# Patient Record
Sex: FEMALE | Race: BLACK OR AFRICAN AMERICAN | Employment: FULL TIME | URBAN - METROPOLITAN AREA
[De-identification: names, ages, dates, MRNs, and addresses within clinical notes are randomized per-mention and may not be internally consistent; named-entity substitution may affect disease eponyms.]

---
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
## 2021-11-04 ENCOUNTER — OFFICE VISIT (OUTPATIENT)
Dept: ORTHOPEDIC SURGERY | Age: 21
End: 2021-11-04
Payer: MEDICAID

## 2021-11-04 VITALS
WEIGHT: 190 LBS | BODY MASS INDEX: 32.44 KG/M2 | HEIGHT: 64 IN | TEMPERATURE: 97 F | HEART RATE: 94 BPM | OXYGEN SATURATION: 100 %

## 2021-11-04 DIAGNOSIS — M25.661 DECREASED RANGE OF MOTION (ROM) OF RIGHT KNEE: ICD-10-CM

## 2021-11-04 DIAGNOSIS — M25.461 EFFUSION OF RIGHT KNEE: ICD-10-CM

## 2021-11-04 DIAGNOSIS — M23.91 INTERNAL DERANGEMENT OF RIGHT KNEE: ICD-10-CM

## 2021-11-04 DIAGNOSIS — M25.561 ACUTE PAIN OF RIGHT KNEE: Primary | ICD-10-CM

## 2021-11-04 PROCEDURE — 99203 OFFICE O/P NEW LOW 30 MIN: CPT | Performed by: PHYSICIAN ASSISTANT

## 2021-11-04 PROCEDURE — 73562 X-RAY EXAM OF KNEE 3: CPT | Performed by: PHYSICIAN ASSISTANT

## 2021-11-04 PROCEDURE — 20610 DRAIN/INJ JOINT/BURSA W/O US: CPT | Performed by: PHYSICIAN ASSISTANT

## 2021-11-04 RX ORDER — TRIAMCINOLONE ACETONIDE 40 MG/ML
40 INJECTION, SUSPENSION INTRA-ARTICULAR; INTRAMUSCULAR ONCE
Status: COMPLETED | OUTPATIENT
Start: 2021-11-04 | End: 2021-11-04

## 2021-11-04 RX ADMIN — TRIAMCINOLONE ACETONIDE 40 MG: 40 INJECTION, SUSPENSION INTRA-ARTICULAR; INTRAMUSCULAR at 15:11

## 2021-11-04 NOTE — PROGRESS NOTES
Patient: Heriberto Graham                MRN: [id number]       SSN: [id number]  YOB: 2000        AGE: 24 y.o. SEX: female          PCP: Caleb Dunlap MD  11/04/21    Chief Complaint   Patient presents with    Knee Pain     Right       HISTORY:  Heriberto Graham is a 24 y.o. female presents to the office with acute onset of pain to the right knee. She has swelling also noted. No problems with weightbearing however flexing her knee is painful. History of a dislocated patella back in 2017 that was nonsurgically addressed. She did have follow-up physical therapy afterwards and believes she was seen through [facility] services. Pain Assessment  11/4/2021   Location of Pain Knee   Location Modifiers Right   Severity of Pain 7   Quality of Pain Aching; Sharp   Duration of Pain Persistent   Frequency of Pain Intermittent   Aggravating Factors Standing;Stairs   Limiting Behavior No   Relieving Factors Elevation; Ice   Result of Injury No           No results found for: HBA1C, RHL4GPZT, ECW8BRRQ  Weight Metrics 11/4/2021 7/17/2019 7/10/2015 4/14/2015   Weight 190 lb 200 lb 148 lb 146 lb   BMI 32.61 kg/m2 34.33 kg/m2 24.36 kg/m2 25.05 kg/m2            Problem List Items Addressed This Visit     None      Visit Diagnoses     Acute pain of right knee    -  Primary    Relevant Orders    AMB POC X-RAY KNEE 3 VIEW (Completed)    Effusion of right knee        Decreased range of motion (ROM) of right knee        Internal derangement of right knee              PAST MEDICAL HISTORY: No past medical history on file. PAST SURGICAL HISTORY: No past surgical history on file. ALLERGIES: No Known Allergies     CURRENT MEDICATIONS:  A list of medications prior to the time of admission include:  Prior to Admission medications    Medication Sig Start Date End Date Taking?  Authorizing Provider   ibuprofen (MOTRIN) 600 mg tablet Take 1 Tab by mouth every six (6) hours as needed for Pain. 7/20/19  Yes Silvana Stapleton MD   ferrous sulfate 325 mg (65 mg iron) tablet Take 1 Tab by mouth two (2) times daily (with meals). Patient not taking: Reported on 11/4/2021 7/20/19   Silvana Stapleton MD   PNV KI.32/EKJSSQT fum/folic ac (PRENATAL PO) Take  by mouth. Patient not taking: Reported on 11/4/2021    Provider, Historical   ondansetron hcl (ZOFRAN, AS HYDROCHLORIDE,) 4 mg tablet Take 1 Tab by mouth every eight (8) hours as needed for Nausea. Patient not taking: Reported on 11/4/2021 7/10/15   MONTSERRAT Weber       FAMILY HISTORY: No family history on file. SOCIAL HISTORY:   Social History     Socioeconomic History    Marital status: SINGLE   Tobacco Use    Smoking status: Never Smoker   Substance and Sexual Activity    Alcohol use: No    Drug use: Never    Sexual activity: Yes     Partners: Male       ROS:No CP, No SOB, No fever/chills nor night sweats. No headaches, vision abnormalities to include double and oral loss of vision. No hearing abnormalities. Musculoskeletal pain per HPI. Pain is exacerbated positionally. Pt denies h/o spinal surgery, injections, or PT/chiropractor. Self treated with less than adequate relief on oral antiinflammatories. . Pt denies change in bowel or bladder habits. Pt denies fever, weight loss, or skin changes. PHYSICAL EXAM:    Visit Vitals  Pulse 94   Temp 97 °F (36.1 °C) (Temporal)   Ht 5' 4\" (1.626 m)   Wt 190 lb (86.2 kg)   SpO2 100%   BMI 32.61 kg/m²       Constitutional: Appears well-developed and well-nourished. No distress. Sitting comfortably in the exam room, interacting with conversation with pleasant affect. Gait is steady and patient exhibits no evidence of ataxia. Patient is able to ambulate without difficulty. No focal neurological deficit noted. No facial droop, slurred speech, or evidence of altered mentation noted on exam.   Skin: Skin over the head, neck, bilateral limbs, and trunk is warm and dry.  No rash or erythema noted. Cranial Nerves II-XII grossly intact  HENT: NC/AT. Normal symmetry, bulk and tone of facial and neck musculature. Trachea midline. No discernible thyromegaly or masses. No involuntary movements. Lymphatic: No preauricular, submandibuar, anterior or posterior cervical lymphadenopathy. Psychiatric: The patient is awake, alert, and oriented to person, place and time. Behavior is normal. Thought content normal.   Cardiovascular: No clubbing, cyanosis. No edema bilateral lower extremities. Pulmonary: No tripoding nor accessory muscle recruitment. Breathing normally, no distress, no audible wheezing. Distal cap refill intact at 2/2 Alber UE / LE. Neuro intact Alber UE/LE to noxious stimuli        Ortho Specific exam:    Right knee reveals a 1+ effusion. No warmth, erythema, edema, or ecchymosis. Patella tracks midline with ranging no instability on MCL and LCL stressing. ACL PCL intact as well with no instability or laxity. There is a positive Kellie sign. No calf tenderness or evidence of DVT right lower extremity. X-ray: [facility] 11/4/2021 space 3 view of the right knee reveals early medial and lateral joint space narrowing with maintained patellofemoral space. No soft tissue calcifications noted. No fracture deformity lesions or other masses identified. IMPRESSION:      ICD-10-CM ICD-9-CM    1. Acute pain of right knee  M25.561 719.46 AMB POC X-RAY KNEE 3 VIEW   2. Effusion of right knee  M25.461 719.06    3. Decreased range of motion (ROM) of right knee  M25.661 719.56    4. Internal derangement of right knee  M23.91 717.9         PLAN: Today recommending aspiration injection of her right knee. Follow-up as needed with her Kellie's sign positive she reoccurs with an effusion and/or pain with limitation in range of motion a MRI would be warranted for fully assessing the soft tissue pathology.       Please Note:    The above patient was treated with the assistance of the [employer] Ultrasound device. Image(s) captured, saved, printed, and copied to chart. Settings on ultrasound 10 MHz with a linear transducer. Chart reviewed for the following:   Cyndie Rajput PA-C, have reviewed the History, Physical and updated the Allergic reactions for Shirlene Hui    TIME OUT performed immediately prior to start of procedure:   Cassandra FATIMA. Regulo SUERO, have performed the following reviews on Shirlene Hui prior to the start of the procedure:            * Patient was identified by name and date of birth   * Agreement on procedure being performed was verified  * Risks and Benefits explained to the patient  * Procedure site verified and marked as necessary  * Patient was positioned for comfort  * Consent was signed and verified             Date of procedure: 11/04/21    Time: 3:07 PM    Procedure performed by:  Marleni Reyes PA-C    Provider assisted by: None     Patient assisted by: self    How tolerated by patient: tolerated the procedure well with no complications    Comments: none    Procedure: Using sterile technique and verbal and written consent were obtained appropriate timeout for patient leg supine right knee flexed at 20 degrees on a bolster 5 cc 1% lidocaine used anesthetize superior lateral interarticular approach. To follow 25 cc of straw-colored blood tinted aspirate removed from the same portal.  To follow 1 cc of Kenalog at 40 mg/mL mixed with 7 mils of Sensorcaine 0.75% injected. Patient tolerated the procedure well. Additionally today we discussed the diagnosis of obesity and the importance of weight management for both cardiovascular health. The patient was recommended to decrease carbohydrate and sugar intake. Patient recommended a formal dietary consult which they will consider and return a call to our office.   In light of the patient's osteoarthritic findings I am making a recommendation for aerobic exercise to include but not limited to stationary bicycle, elliptical, therapeutic walking with good shoes and or swimming. Patient should avoid any running or jumping. If using the treadmill then recommendation for no elevation and no running or jogging. No Narcotic indicated today. Patient given pain medication for short term acute pain relief. Goal is to treat patient according to above plan and to ultimately have patient off all pain medications once appropriate. If chronic pain management is required beyond what is expected for current orthopedic problem, will refer patient to pain management.  was reviewed and will be reviewed with every medication refill request.         Patient provided a reminder for a \"due or due soon\" health maintenance. I have asked the patient to schedule an appointment with their primary care provider for follow-up on general health maintenance concerns. Today all the patient's questions were answered to their satisfaction. Copies of x-rays reviewed if obtained this visit, and provided to patient. Dictation disclaimer:  Please note that this dictation was completed with [employer], the computer voice recognition software. Quite often unanticipated grammatical, syntax, homophones, and other interpretive errors are inadvertently transcribed by the computer software. Please disregard these errors. Please excuse any errors that have escaped final proofreading. Evelyn CARUSO, APC, MPAS, PA-C  [facility]

## 2022-04-25 ENCOUNTER — OFFICE VISIT (OUTPATIENT)
Dept: ORTHOPEDIC SURGERY | Age: 22
End: 2022-04-25
Payer: MEDICAID

## 2022-04-25 VITALS
BODY MASS INDEX: 32.44 KG/M2 | HEIGHT: 64 IN | OXYGEN SATURATION: 99 % | WEIGHT: 190 LBS | HEART RATE: 92 BPM | TEMPERATURE: 97.8 F

## 2022-04-25 DIAGNOSIS — M25.562 ACUTE PAIN OF LEFT KNEE: Primary | ICD-10-CM

## 2022-04-25 DIAGNOSIS — S83.412A SPRAIN OF MEDIAL COLLATERAL LIGAMENT OF LEFT KNEE, INITIAL ENCOUNTER: ICD-10-CM

## 2022-04-25 DIAGNOSIS — M25.462 EFFUSION OF LEFT KNEE: ICD-10-CM

## 2022-04-25 DIAGNOSIS — M25.662 DECREASED RANGE OF MOTION (ROM) OF LEFT KNEE: ICD-10-CM

## 2022-04-25 PROCEDURE — 99203 OFFICE O/P NEW LOW 30 MIN: CPT | Performed by: PHYSICIAN ASSISTANT

## 2022-04-25 PROCEDURE — 73562 X-RAY EXAM OF KNEE 3: CPT | Performed by: PHYSICIAN ASSISTANT

## 2022-04-25 NOTE — PROGRESS NOTES
Patient: Shubham Little                MRN: [id number]       SSN: [id number]  YOB: 2000        AGE: 24 y.o. SEX: female          PCP: Fuentes Tesfaye MD  04/25/22    Chief Complaint   Patient presents with    Knee Pain     lt       HISTORY:  Shubham Little is a 24 y.o. female presents to the office with a complaint of left knee pain x1 week. No history of trauma. Pain associated with flexion particularly and noticed when she stands from a sitting position and sits from a standing position. If she plants her left foot and twists about the knee she also has pain over the inner portion. She is employed as a  locally for [employer]. She does a good deal of standing. She has tried Tylenol and Motrin with minimum symptom relief. Pain associated with the inner portion of her left knee dull aching characteristic at rest with an occasional sharp stabbing sensation when she first stands from a sitting position. Otherwise the dull ache continues 24/7. Pain Assessment  4/25/2022   Location of Pain Knee   Location Modifiers Left   Severity of Pain 8   Quality of Pain Throbbing; Sharp   Duration of Pain Persistent   Frequency of Pain Constant   Aggravating Factors Walking;Bending   Limiting Behavior Yes   Relieving Factors -   Result of Injury -           No results found for: HBA1C, LAH1CKLY, ITZ6NXBA  Weight Metrics 4/25/2022 11/4/2021 7/17/2019 7/10/2015 4/14/2015   Weight 190 lb 190 lb 200 lb 148 lb 146 lb   BMI 32.61 kg/m2 32.61 kg/m2 34.33 kg/m2 24.36 kg/m2 25.05 kg/m2            Problem List Items Addressed This Visit     None      Visit Diagnoses     Acute pain of left knee    -  Primary    Relevant Orders    AMB POC X-RAY KNEE 3 VIEW    Decreased range of motion (ROM) of left knee        Relevant Orders    AMB POC X-RAY KNEE 3 VIEW    BMI 32.0-32.9,adult        Sprain of medial collateral ligament of left knee, initial encounter        Effusion of left knee              PAST MEDICAL HISTORY: History reviewed. No pertinent past medical history. PAST SURGICAL HISTORY: History reviewed. No pertinent surgical history. ALLERGIES: No Known Allergies     CURRENT MEDICATIONS:  A list of medications prior to the time of admission include:  Prior to Admission medications    Medication Sig Start Date End Date Taking? Authorizing Provider   ferrous sulfate 325 mg (65 mg iron) tablet Take 1 Tab by mouth two (2) times daily (with meals). Patient not taking: Reported on 11/4/2021 7/20/19   Oneal Lopez MD   ibuprofen (MOTRIN) 600 mg tablet Take 1 Tab by mouth every six (6) hours as needed for Pain. Patient not taking: Reported on 4/25/2022 7/20/19   Oneal Lopez MD   PNV ZH.09/HHTECRS fum/folic ac (PRENATAL PO) Take  by mouth. Patient not taking: Reported on 11/4/2021    Provider, Historical   ondansetron hcl (ZOFRAN, AS HYDROCHLORIDE,) 4 mg tablet Take 1 Tab by mouth every eight (8) hours as needed for Nausea. Patient not taking: Reported on 11/4/2021 7/10/15   MONTSERRAT Jensen       FAMILY HISTORY: History reviewed. No pertinent family history. SOCIAL HISTORY:   Social History     Socioeconomic History    Marital status: SINGLE   Tobacco Use    Smoking status: Never Smoker   Substance and Sexual Activity    Alcohol use: No    Drug use: Never    Sexual activity: Yes     Partners: Male       ROS:No CP, No SOB, No fever/chills nor night sweats. No headaches, vision abnormalities to include double and or loss of vision. No dizziness. No hearing abnormalities. No Chest Pain nor Shortness of breath. Pt denies h/o spinal surgery, injections, or PT/chiropractor. Patient has attempted self treatment with less than adequate relief on oral and topical analgesic / anti inflammatory medications . Pt denies change in bowel or bladder habits. No saddle paresthesia / anesthesia.  Pt denies fever, unplanned weight loss / weight gains, and no skin changes. Musculoskeletal pain per HPI. Pain is exacerbated positionally. PHYSICAL EXAM:    Visit Vitals  Pulse 92   Temp 97.8 °F (36.6 °C) (Temporal)   Ht 5' 4\" (1.626 m)   Wt 190 lb (86.2 kg)   SpO2 99%   BMI 32.61 kg/m²       Constitutional: Appears well-developed and well-nourished. No distress. Sitting comfortably in the exam room, interacting with conversation with pleasant affect. Gait appears steady and patient exhibits no evidence of ataxia. Patient is able to ambulate with caution. No focal neurological deficit noted. No facial droop, slurred speech, or evidence of altered mentation noted on exam.   Skin: Skin over the head, neck, bilateral limbs, and trunk is warm and dry. No rash or erythema noted. Cranial Nerves II-XII grossly intact  HENT: NC/AT. Normal symmetry, bulk and tone of facial and neck musculature. Trachea midline. No discernible thyromegaly or masses. No involuntary movements. Lymphatic: No preauricular, submandibuar, anterior or posterior cervical lymphadenopathy. Psychiatric: The patient is awake, alert, and oriented to person, place and time. Behavior is normal. Thought content normal.   Cardiovascular: No clubbing, cyanosis. No edema bilateral lower extremities. Pulmonary: No tripoding nor accessory muscle recruitment. Breathing normally, no distress, no audible wheezing. Distal cap refill intact at 2/2 Alber UE / LE. Neuro intact Alber UE/LE to noxious stimuli        Ortho Specific exam:    Left knee reveals no warmth, erythema, edema, or ecchymosis. She has a trace effusion. On exam while supine there is a negative Kellie sign. MCL and LCL intact with no laxity or pain. There is pain at the insertion of the MCL. ACL and PCL intact with no laxity nor pain through stressing. Trace popliteal fullness with no evidence of DVT or calf tenderness left lower extremity.     X[facility] 4/25/2022 space 3 view of the left knee AP lateral and tunnel reveals normal bony anatomy with no evidence of fracture deformity lesions or masses. No soft tissue ossifications. The medial lateral and patellofemoral spaces are all well balanced. IMPRESSION:      ICD-10-CM ICD-9-CM    1. Acute pain of left knee  M25.562 719.46 AMB POC X-RAY KNEE 3 VIEW   2. Decreased range of motion (ROM) of left knee  M25.662 719.56 AMB POC X-RAY KNEE 3 VIEW   3. BMI 32.0-32.9,adult  Z68.32 V85.32    4. Sprain of medial collateral ligament of left knee, initial encounter  S83.412A 844.1    5. Effusion of left knee  M25.462 719.06         PLAN: Today we discussed alternatives care to include but not limited to a hinged stay neoprene knee brace for the next 2 weeks with RICE protocol if necessary. Patient may continue Tylenol/Motrin per manufactures recommended dosing. In addition she was given a note for work allowing her to sit to stand or stand to sit to comfort. No running or jumping and minimal squatting and no crawling recommended. X-rays reviewed today all of her questions answered to her satisfaction. Follow-up in 2 weeks             Additionally today we discussed the diagnosis of obesity and the importance of weight management for both cardiovascular health. The patient was recommended to decrease carbohydrate and sugar intake. Patient recommended a formal dietary consult which they will consider and return a call to our office. In light of the patient's osteoarthritic findings I am making a recommendation for aerobic exercise to include but not limited to stationary bicycle, elliptical, therapeutic walking with good shoes and or swimming. Patient should avoid any running or jumping. If using the treadmill then recommendation for no elevation and no running or jogging. Care plan outlined and precautions discussed. Results were reviewed with the patient. All medications were reviewed with the patient.  All of pt's questions and concerns were addressed. Alarm symptoms and return precautions associated with chief complaint and evaluation were reviewed with the patient in detail. The patient demonstrated adequate understanding. The patient expresses willing compliance with the treatment plan. Special note: Medication management discussed in detail all patient's questions answered to their satisfaction. No Narcotic indicated today. Patient given pain medication for short term acute pain relief. Goal is to treat patient according to above plan and to ultimately have patient off all pain medications once appropriate. If chronic pain management is required beyond what is expected for current orthopedic problem, will refer patient to pain management.  was reviewed and will be reviewed with every medication refill request.         Patient provided a reminder for a \"due or due soon\" health maintenance. I have asked the patient to schedule an appointment with their primary care provider for follow-up on general health maintenance concerns. Today all the patient's questions were answered to their satisfaction. Copies of x-rays reviewed if obtained this visit, and provided to patient. Dictation disclaimer:  Please note that this dictation was completed with SABIA, the computer voice recognition software. Quite often unanticipated grammatical, syntax, homophones, and other interpretive errors are inadvertently transcribed by the computer software. Please disregard these errors. Please excuse any errors that have escaped final proofreading. Yane CARUSO, APC, MPAS, PA-C  [facility]

## 2022-04-25 NOTE — LETTER
4/25/2022 2:40 PM    Ms. Enoch Goodman [phone]    To whom it may concern:    Above patient is currently being seen and treated by our office for an orthopedic concern. Please allow her to sit to stand, stand to sit for comfort.     Above recommendations are valid for the next 21 days from date of today's letter          Sincerely,      Yeyo Troncoso PA-C

## 2022-04-26 ENCOUNTER — OFFICE VISIT (OUTPATIENT)
Dept: ORTHOPEDIC SURGERY | Age: 22
End: 2022-04-26
Payer: MEDICAID

## 2022-04-26 ENCOUNTER — TELEPHONE (OUTPATIENT)
Dept: ORTHOPEDIC SURGERY | Age: 22
End: 2022-04-26

## 2022-04-26 VITALS
HEART RATE: 94 BPM | BODY MASS INDEX: 32.44 KG/M2 | HEIGHT: 64 IN | OXYGEN SATURATION: 98 % | TEMPERATURE: 98.2 F | WEIGHT: 190 LBS

## 2022-04-26 DIAGNOSIS — M25.562 ACUTE PAIN OF LEFT KNEE: Primary | ICD-10-CM

## 2022-04-26 PROCEDURE — 20611 DRAIN/INJ JOINT/BURSA W/US: CPT | Performed by: PHYSICIAN ASSISTANT

## 2022-04-26 RX ORDER — TRIAMCINOLONE ACETONIDE 40 MG/ML
40 INJECTION, SUSPENSION INTRA-ARTICULAR; INTRAMUSCULAR ONCE
Status: COMPLETED | OUTPATIENT
Start: 2022-04-26 | End: 2022-04-26

## 2022-04-26 RX ADMIN — TRIAMCINOLONE ACETONIDE 40 MG: 40 INJECTION, SUSPENSION INTRA-ARTICULAR; INTRAMUSCULAR at 14:38

## 2022-04-26 NOTE — TELEPHONE ENCOUNTER
Patient called stating that she came into the office yesterday for her knee and woke up this morning and is unable to move her knee and was wondering if she can come in to be seen today.  Please advise patient at [phone]

## 2022-04-26 NOTE — PROGRESS NOTES
Patient: Lesly Gaspar                MRN: [id number]       SSN: [id number]  YOB: 2000        AGE: 24 y.o. SEX: female          PCP: Pelon Hutchison MD  04/26/22 4/26/2022: Patient in the office with an occurrence of acute worsening left knee pain. She was seen on 4/25/2022 and found to have a MCL sprain. She was placed in a hinged neoprene range of motion brace. She wear the brace to the can continue part of last evening up until the overnight hours when she had to remove due to significant pain and swelling with severely decreased range of motion of her left knee. No recent trauma. Chief Complaint   Patient presents with    Knee Pain     lt       HISTORY:  Lesly Gaspar is a 24 y.o. female presents to the office with a complaint of left knee pain x1 week. No history of trauma. Pain associated with flexion particularly and noticed when she stands from a sitting position and sits from a standing position. If she plants her left foot and twists about the knee she also has pain over the inner portion. She is employed as a  locally for [employer]. She does a good deal of standing. She has tried Tylenol and Motrin with minimum symptom relief. Pain associated with the inner portion of her left knee dull aching characteristic at rest with an occasional sharp stabbing sensation when she first stands from a sitting position. Otherwise the dull ache continues 24/7.             Pain Assessment  4/26/2022   Location of Pain Knee   Location Modifiers Left   Severity of Pain 10   Quality of Pain Sharp   Duration of Pain Persistent   Frequency of Pain Constant   Aggravating Factors Bending;Walking;Standing   Limiting Behavior Yes   Relieving Factors Nothing   Result of Injury No           No results found for: HBA1C, JZV3NRQF, MCN1ULVK, UBL8WTMB  Weight Metrics 4/26/2022 4/25/2022 11/4/2021 7/17/2019 7/10/2015 4/14/2015   Weight 190 lb 190 lb 190 lb 200 lb 148 lb 146 lb   BMI 32.61 kg/m2 32.61 kg/m2 32.61 kg/m2 34.33 kg/m2 24.36 kg/m2 25.05 kg/m2            Problem List Items Addressed This Visit     None          PAST MEDICAL HISTORY: History reviewed. No pertinent past medical history. PAST SURGICAL HISTORY: History reviewed. No pertinent surgical history. ALLERGIES: No Known Allergies     CURRENT MEDICATIONS:  A list of medications prior to the time of admission include:  Prior to Admission medications    Medication Sig Start Date End Date Taking? Authorizing Provider   ferrous sulfate 325 mg (65 mg iron) tablet Take 1 Tab by mouth two (2) times daily (with meals). Patient not taking: Reported on 11/4/2021 7/20/19   Canelo Sousa MD   ibuprofen (MOTRIN) 600 mg tablet Take 1 Tab by mouth every six (6) hours as needed for Pain. Patient not taking: Reported on 4/25/2022 7/20/19   Canelo Sousa MD   PNV WY.19/SBHGSYX fum/folic ac (PRENATAL PO) Take  by mouth. Patient not taking: Reported on 11/4/2021    Provider, Historical   ondansetron hcl (ZOFRAN, AS HYDROCHLORIDE,) 4 mg tablet Take 1 Tab by mouth every eight (8) hours as needed for Nausea. Patient not taking: Reported on 11/4/2021 7/10/15   MONTSERRAT Koroma       FAMILY HISTORY: History reviewed. No pertinent family history. SOCIAL HISTORY:   Social History     Socioeconomic History    Marital status: SINGLE   Tobacco Use    Smoking status: Never Smoker    Smokeless tobacco: Never Used   Substance and Sexual Activity    Alcohol use: No    Drug use: Never    Sexual activity: Yes     Partners: Male       ROS:No CP, No SOB, No fever/chills nor night sweats. No headaches, vision abnormalities to include double and or loss of vision. No dizziness. No hearing abnormalities. No Chest Pain nor Shortness of breath. Pt denies h/o spinal surgery, injections, or PT/chiropractor.  Patient has attempted self treatment with less than adequate relief on oral and topical analgesic / anti inflammatory medications . Pt denies change in bowel or bladder habits. No saddle paresthesia / anesthesia. Pt denies fever, unplanned weight loss / weight gains, and no skin changes. Musculoskeletal pain per HPI. Pain is exacerbated positionally. PHYSICAL EXAM:    Visit Vitals  Pulse 94   Temp 98.2 °F (36.8 °C) (Temporal)   Ht 5' 4\" (1.626 m)   Wt 190 lb (86.2 kg)   SpO2 98%   BMI 32.61 kg/m²       Constitutional: Appears well-developed and well-nourished. No distress. Sitting comfortably in the exam room, interacting with conversation with pleasant affect. Gait appears steady and patient exhibits no evidence of ataxia. Patient is able to ambulate with caution. No focal neurological deficit noted. No facial droop, slurred speech, or evidence of altered mentation noted on exam.   Skin: Skin over the head, neck, bilateral limbs, and trunk is warm and dry. No rash or erythema noted. Cranial Nerves II-XII grossly intact  HENT: NC/AT. Normal symmetry, bulk and tone of facial and neck musculature. Trachea midline. No discernible thyromegaly or masses. No involuntary movements. Lymphatic: No preauricular, submandibuar, anterior or posterior cervical lymphadenopathy. Psychiatric: The patient is awake, alert, and oriented to person, place and time. Behavior is normal. Thought content normal.   Cardiovascular: No clubbing, cyanosis. No edema bilateral lower extremities. Pulmonary: No tripoding nor accessory muscle recruitment. Breathing normally, no distress, no audible wheezing. Distal cap refill intact at 2/2 Alber UE / LE. Neuro intact Alber UE/LE to noxious stimuli        Ortho Specific exam:    Left knee reveals no warmth, erythema, edema, or ecchymosis. She has a 1+ effusion. On exam while supine there is a negative Kellie sign. MCL and LCL intact with no laxity or pain. There is pain at the insertion of the MCL.     ACL and PCL intact with no laxity nor pain through stressing. Trace popliteal fullness with no evidence of DVT or calf tenderness left lower extremity. [facility] of the left knee AP lateral and tunnel reveals normal bony anatomy with no evidence of fracture deformity lesions or masses. No soft tissue ossifications. The medial lateral and patellofemoral spaces are all well balanced. IMPRESSION:    Acute pain secondary to conservative bracing for MCL sprain of the left knee  Left knee synovitis      PLAN: In light of her acute change in symptoms I am recommending an aspiration injection today. Patient agreed with plan of care. Patient to discontinue use of her hinged neoprene sleeve for the next 24 hours. She was given a work note excusing her for 1 week. She will follow back prior to the expiration of that note. Procedural: Using sterile technique and verbal back and surrounding orbit timeout formed patient laying supine left knee flexed to 20 degrees on a bolster 5 cc of 0.5% Sensorcaine used to anesthetize the superior lateral interarticular approach. To follow only a flash of synovial fluid obtained which was discarded. To follow 1 cc of Kenalog at 40 mg/mL mixed with 7 mils of Sensorcaine 0.75%. There were no complications. Patient tolerated the procedure well. Please Note:        [address] using a frequency of 10MHz with a 12L-RS transducer head was used to confirm needle placement. Ultrasound images captured using [address] Ultrasound machine and scanned into patient's chart. Chart reviewed for the following:   Atilio Rajput PA-C, have reviewed the History, Physical and updated the Allergic reactions for Shirlene Hui    TIME OUT performed immediately prior to start of procedure:   Chen FATIMA PA-C, have performed the following reviews on Shirlene Hui prior to the start of the procedure:            * Patient was identified by name and date of birth   * Agreement on procedure being performed was verified  * Risks and Benefits explained to the patient  * Procedure site verified and marked as necessary  * Patient was positioned for comfort  * Consent was signed and verified             Date of procedure: 04/26/22    Time: 2:23 PM    Procedure performed by:  Patsy Garcia PA-C    Provider assisted by: None     Patient assisted by: self    How tolerated by patient: tolerated the procedure well with no complications    Comments: none      Additionally today we discussed the diagnosis of obesity and the importance of weight management for both cardiovascular health. The patient was recommended to decrease carbohydrate and sugar intake. Patient recommended a formal dietary consult which they will consider and return a call to our office. In light of the patient's osteoarthritic findings I am making a recommendation for aerobic exercise to include but not limited to stationary bicycle, elliptical, therapeutic walking with good shoes and or swimming. Patient should avoid any running or jumping. If using the treadmill then recommendation for no elevation and no running or jogging. Care plan outlined and precautions discussed. Results were reviewed with the patient. All medications were reviewed with the patient. All of pt's questions and concerns were addressed. Alarm symptoms and return precautions associated with chief complaint and evaluation were reviewed with the patient in detail. The patient demonstrated adequate understanding. The patient expresses willing compliance with the treatment plan. Special note: Medication management discussed in detail all patient's questions answered to their satisfaction. No Narcotic indicated today. Patient given pain medication for short term acute pain relief. Goal is to treat patient according to above plan and to ultimately have patient off all pain medications once appropriate.  If chronic pain management is required beyond what is expected for current orthopedic problem, will refer patient to pain management.  was reviewed and will be reviewed with every medication refill request.         Patient provided a reminder for a \"due or due soon\" health maintenance. I have asked the patient to schedule an appointment with their primary care provider for follow-up on general health maintenance concerns. Today all the patient's questions were answered to their satisfaction. Copies of x-rays reviewed if obtained this visit, and provided to patient. Dictation disclaimer:  Please note that this dictation was completed with [employer], the computer voice recognition software. Quite often unanticipated grammatical, syntax, homophones, and other interpretive errors are inadvertently transcribed by the computer software. Please disregard these errors. Please excuse any errors that have escaped final proofreading. Aileen Phalen Brillhart APA, APC, MPAS, PA-C  [facility]

## 2022-04-26 NOTE — LETTER
NOTIFICATION RETURN TO WORK    4/26/2022 1:39 PM    Ms. Enoch Goodman [phone]      To Whom It May Concern:    Paul Acosta is currently under the care of [address] [place] [address]. She will remain out of work until 5/3/2022. If there are questions or concerns please have the patient contact our office.         Sincerely,      Andie Gandhi PA-C

## 2022-08-16 ENCOUNTER — HOSPITAL ENCOUNTER (EMERGENCY)
Age: 22
Discharge: HOME | End: 2022-08-16
Attending: STUDENT IN AN ORGANIZED HEALTH CARE EDUCATION/TRAINING PROGRAM
Payer: MEDICAID

## 2022-08-16 VITALS
HEART RATE: 70 BPM | HEIGHT: 64 IN | OXYGEN SATURATION: 100 % | DIASTOLIC BLOOD PRESSURE: 85 MMHG | RESPIRATION RATE: 16 BRPM | WEIGHT: 190 LBS | BODY MASS INDEX: 32.44 KG/M2 | SYSTOLIC BLOOD PRESSURE: 125 MMHG | TEMPERATURE: 98.6 F
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
## 2022-08-16 DIAGNOSIS — S33.5XXA SPRAIN OF LOW BACK, INITIAL ENCOUNTER: ICD-10-CM

## 2022-08-16 LAB — HCG UR QL: NEGATIVE

## 2022-08-16 PROCEDURE — 99283 EMERGENCY DEPT VISIT LOW MDM: CPT

## 2022-08-16 PROCEDURE — 81025 URINE PREGNANCY TEST: CPT

## 2022-08-16 RX ORDER — IBUPROFEN 800 MG/1
800 TABLET ORAL
Qty: 20 TABLET | Refills: 0 | Status: SHIPPED | OUTPATIENT
Start: 2022-08-16 | End: 2022-08-23

## 2022-08-16 RX ORDER — METHOCARBAMOL 750 MG/1
750 TABLET, FILM COATED ORAL 4 TIMES DAILY
Qty: 20 TABLET | Refills: 0 | Status: SHIPPED | OUTPATIENT
Start: 2022-08-16 | End: 2022-09-11
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
## 2022-08-16 NOTE — LETTER
NOTIFICATION RETURN TO WORK / SCHOOL    8/16/2022 1:13 AM    Ms. Enoch Goodman [phone]      To Whom It May Concern:    Cam Kim is currently under the care of [facility] EMERGENCY DEPT. She will return to work/school on: 8/8/18/2022    If there are questions or concerns please have the patient contact our office.         Sincerely,      MONTSERRAT Johnson

## 2022-09-11 ENCOUNTER — HOSPITAL ENCOUNTER (EMERGENCY)
Age: 22
Discharge: HOME | End: 2022-09-12
Attending: STUDENT IN AN ORGANIZED HEALTH CARE EDUCATION/TRAINING PROGRAM
Payer: MEDICAID

## 2022-09-11 VITALS
OXYGEN SATURATION: 95 % | HEIGHT: 64 IN | BODY MASS INDEX: 34.15 KG/M2 | SYSTOLIC BLOOD PRESSURE: 110 MMHG | DIASTOLIC BLOOD PRESSURE: 73 MMHG | WEIGHT: 200 LBS | HEART RATE: 90 BPM | TEMPERATURE: 98.9 F | RESPIRATION RATE: 16 BRPM

## 2022-09-11 DIAGNOSIS — S39.012A BACK STRAIN, INITIAL ENCOUNTER: ICD-10-CM
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
## 2022-09-11 PROCEDURE — 99283 EMERGENCY DEPT VISIT LOW MDM: CPT

## 2022-09-11 RX ORDER — METHOCARBAMOL 750 MG/1
750 TABLET, FILM COATED ORAL 4 TIMES DAILY
Qty: 20 TABLET | Refills: 0 | Status: SHIPPED | OUTPATIENT
Start: 2022-09-11

## 2022-09-11 NOTE — Clinical Note
Basia Leonardo was seen and treated in our emergency department on 9/11/2022. To whom it may concern:     Ms. Basia Leonardo was seen in the ED on 9/11/2022 and may return to work under the following restrictions, to be followed for 1 week. No heavy lifting greater than 10 lbs and no prolonged standing longer than 1 hour.      Sincerely,     PIO Aviles [place], [place]

## 2022-09-11 NOTE — Clinical Note
Salty Hawkins was seen and treated in our emergency department on 9/11/2022. To whom it may concern:     Ms. Salty Hawkins was seen in the ED on 9/11/2022 and may return to work under the following restrictions, to be followed for 1 week. No heavy lifting greater than 10 lbs and no prolonged standing longer than 1 hour.      Sincerely,     Liliana Angel PA-C     [place], [place]
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
## 2022-09-12 NOTE — DISCHARGE INSTRUCTIONS
[employer] Activation    Thank you for requesting access to [employer]. Please follow the instructions below to securely access and download your online medical record. [employer] allows you to send messages to your doctor, view your test results, renew your prescriptions, schedule appointments, and more. How Do I Sign Up? In your internet browser, go to [email or web address]  Click on the First Time User? Click Here link in the Sign In box. You will be redirect to the New Member Sign Up page. Enter your [employer] Access Code exactly as it appears below. You will not need to use this code after youve completed the sign-up process. If you do not sign up before the expiration date, you must request a new code. [employer] Access Code: [email or web address]  Expires: 2022 12:36 AM (This is the date your [employer] access code will )    Enter the last four digits of your Social Security Number (xxxx) and Date of Birth (mm/dd/yyyy) as indicated and click Submit. You will be taken to the next sign-up page. Create a [employer] ID. This will be your [employer] login ID and cannot be changed, so think of one that is secure and easy to remember. Create a [employer] password. You can change your password at any time. Enter your Password Reset Question and Answer. This can be used at a later time if you forget your password. Enter your e-mail address. You will receive e-mail notification when new information is available in [address] Click Sign Up. You can now view and download portions of your medical record. Click the [employer] link to download a portable copy of your medical information. Additional Information    If you have questions, please visit the Frequently Asked Questions section of the [employer] website at [email or web address] Remember, [employer] is NOT to be used for urgent needs. For medical emergencies, dial 911.
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
